# Patient Record
(demographics unavailable — no encounter records)

---

## 2024-11-11 NOTE — HISTORY OF PRESENT ILLNESS
[FreeTextEntry1] : Abdelrahman is a pleasant 36-year-old male who returns for follow-up.  At this stage she tells me that his right shoulder discomfort has gone away completely with nonoperative modalities.  States that his back to the gym without any biceps discomfort.  Today specifically he tells me that he has been having lateral sided discomfort at the elbow.  I have inquired regarding any numbness in the anterior forearm and he states that has gone away completely.

## 2024-11-11 NOTE — PHYSICAL EXAM
[de-identified] : Examination of the [right] elbow reveals very minimal tenderness at the level of the lateral epicondyle.  Otherwise sensation is intact he has full elbow range of motion active and passive.  Biceps is nontender with hook testing.

## 2024-11-11 NOTE — ASSESSMENT
[FreeTextEntry1] : ASSESSMENT: The patient comes in today with history of right shoulder discomfort which has improved tremendously with nonoperative modalities.  He is delighted.  Regarding his right elbow status post distal biceps tenodesis he describes back to activities at the gym.  He continues to follow-up with physical therapy for strengthening purposes otherwise is delighted.  I have inquired regarding his numbness of the forearm this is gone away completely.  I am delighted to hear this. Separately we have discussed lateral epicondylitis.  I have provided him with new PT referral and anti-inflammatory medication as requested.   The patient was adequately and thoroughly informed of my assessment of their current condition(s).  - This may diminish bodily function for the extremity. We discussed prognosis, tx modalities including operative and nonoperative options for the above diagnostic assessment. As always, 2nd opinion is always provided as an option.  When accessible, I was able to review other physicians note(s) including reviewing other tests, imaging results as well as personally view these results for my own interpretation.  A prescription for meloxicam was given today. The patient was instructed to take this with food and discontinue other NSAIDs while taking this. The risks, benefits and black box warnings were discussed. The patient was instructed to discontinue the medication if it began hurting his stomach.   The patient was adequately and thoroughly informed of my assessment of their current condition(s).  DISCUSSION: 1.  Meloxicam as above.  PT prescription provided.  Follow-up 3 months. 2. [x] 3. [x]

## 2025-04-11 NOTE — HISTORY OF PRESENT ILLNESS
[FreeTextEntry1] : Abdelrahman is a pleasant 37-year-old male who returns for follow-up with history of right elbow distal biceps tendon repair on 2/26/24. He is doing well at this time and his delighted. He is back to work and performing all ADLs with his right upper extremity without difficulty. He denies anterior forearm numbness.  Separately, he presents today with chronic exacerbated left elbow lateral sided discomfort.  Symptoms are bothersome.

## 2025-04-11 NOTE — ASSESSMENT
[FreeTextEntry1] : ASSESSMENT: The patient comes in today for follow-up with history of right elbow distal biceps tendon repair on 2/26/24. He is doing well at this time and his delighted. He is back to work and performing all ADLs with his right upper extremity without difficulty. He denies anterior forearm numbness.  Separately, he presents today with chronic exacerbated left elbow lateral sided discomfort.  Symptoms are bothersome.  The symptoms today are consistent with left elbow lateral epicondylitis i.e. tennis elbow.  Treatment modalities were discussed, the patient elects for topical anti-inflammatory medication for symptomatic relief.  We have also discussed home exercise program, activity modifications, as well as physical therapy for his condition.   The patient was adequately and thoroughly informed of my assessment of their current condition(s).  - This may diminish bodily function for the extremity.  We discussed prognosis, treatment modalities including operative and nonoperative options for the above diagnostic assessment. As always, 2nd opinion is always provided as an option. For this, when accessible, I was able to review other physicians note(s) including reviewing other tests, imaging results as well as personally view these results for my own interpretation.      The patient was adequately and thoroughly informed of my assessment of their current condition(s).  A prescription of [diclofenac gel] has been given to the patient. The risks, side effects, benefits and black box warnings were discussed.  The patient understands the risk profile and would like to take the medication.   DISCUSSION: 1.  Diclofenac gel as above.  Activity modifications.  PT and HEP discussed.

## 2025-04-11 NOTE — PHYSICAL EXAM
[de-identified] : Examination of the right elbow reveals full range of motion both active and passive.  He denies anterior forearm numbness, sensation is intact.  Biceps is nontender with hook testing.  Examination of the [left] elbow reveals tenderness at the level of the lateral epicondyle. There is pain with resisted wrist and finger extension at the elbow. [de-identified] : Outside imaging 3 views of [right] elbow were reviewed today in my office from a separate provider and were seen by me and discussed with the patient.  These are [grossly unremarkable]  3 views of [left] elbow were obtained today in my office and were seen by me and discussed with the patient.  These are with mild elbow OA.